# Patient Record
(demographics unavailable — no encounter records)

---

## 2024-10-16 NOTE — REASON FOR VISIT
[Initial Consultation] : an initial consultation [Redundant penile skin] : redundant penile skin [PCP] : ~pcp~ [Parents] : parents

## 2024-11-08 NOTE — PHYSICAL EXAM
[TextBox_92] : Penis: Incompletely circumcised with asymmetric irregular redundant skin, straight without adhesions or skin bridges; distinct penoscrotal and penopubic junctions. Meatus orthotopic without apparent stenosis. Testicles: Both testes in dependent position of scrotum without masses or tenderness. Scrotal/Inguinal: No palpable inguinal hernias, hydroceles

## 2024-11-08 NOTE — CONSULT LETTER
[FreeTextEntry1] : Dear Dr. TYE GARSIA ,  I had the pleasure of consulting on TRINITY SMITHPROBHERBS today.  Below is my note regarding the office visit today.  Thank you so very much for allowing me to participate in TRINITY's  care.  Please don't hesitate to call me should any questions or issues arise .  Sincerely,   David Dorantes MD, FACS, [facility] Chief, Pediatric Urology Professor of Urology and Pediatrics [facility] [employer]  President, American U[employer] Association - [place] Section Past-President, Societies for Pediatric Urology

## 2024-11-08 NOTE — CONSULT LETTER
[FreeTextEntry1] : Dear Dr. TYE GARSIA ,  I had the pleasure of consulting on TRINITY SMITHPROBHERBS today.  Below is my note regarding the office visit today.  Thank you so very much for allowing me to participate in TRINITY's  care.  Please don't hesitate to call me should any questions or issues arise .  Sincerely,   David Dorantes MD, FACS, [facility] Chief, Pediatric Urology Professor of Urology and Pediatrics [facility] [employer]  President, American U[employer] Association - [place] Section Past-President, Societies [employer] Pediatric Urology

## 2024-11-08 NOTE — CONSULT LETTER
[FreeTextEntry1] : Dear Dr. TYE GARSIA ,  I had the pleasure of consulting on TRINITY SMITHPROBHERBS today.  Below is my note regarding the office visit today.  Thank you so very much for allowing me to participate in TRINITY's  care.  Please don't hesitate to call me should any questions or issues arise .  Sincerely,   David Dorantes MD, FACS, [facility] Chief, Pediatric Urology Professor of Urology and Pediatrics [facility] [employer]  President, American U[employer] Association - [place] Section Past-President, Societies for Pediatric Urology

## 2024-11-08 NOTE — ASSESSMENT
[FreeTextEntry1] : TRINITY has irregular and very asymmetric skin following the circumcision..  I discussed the options including observation and surgery. The principles of the operation and the anticipated postoperative course were discussed.  After discussing the risks and benefits and possible complications (including but not limited to penile injury, bleeding, infection, penile deformity and need for additional surgery), the decision to proceed with penoplasty surgery under general anesthesia was made.  All questions were answered.

## 2024-11-08 NOTE — HISTORY OF PRESENT ILLNESS
[TextBox_4] : TRINITY is here for evaluation today. He was born at term after an unassisted conception and was circumcised in the nursery. The family's concerns with redundancy of the skin following the circumcision. The penis has not changed in its configuration since the parents first noticed this. No urinary tract or penile redness or infections. He makes ample wet diapers without hematuria.  No family history of penile abnormalities.   Of note, patient with history of Hirschsprung's disease, s/p pull through with anorectal botox 5/2023.

## 2025-01-07 NOTE — HISTORY OF PRESENT ILLNESS
[de-identified] : S/P 1 night hospitalization in [facility] for asthma attack. [FreeTextEntry6] : He developed a wheeze and went to [facility] ER, given treatments, wheezing persisted, so he was observed overnight and given further treatments, and DC's the next day. He was DC's yesterday. Grandma says he is still wheezing. some and she gave him two puffs of Albuterol Inhaler via spacer, and he responded well.  He is supposed to continue Albuterol through today and then on an as needed basis. No one else is sick at home. When he coughed this morning it sounded wet with mucus according to grandma.  This office is his medical home.

## 2025-01-07 NOTE — DISCUSSION/SUMMARY
[FreeTextEntry1] : Albuterol inhalation q4-6 hours for 7 days. Humidified air during sleep. Ensure adequate hydration and nourishment. F/U if not resolving.

## 2025-01-07 NOTE — PHYSICAL EXAM
[Alert] : alert [Normocephalic] : normocephalic [EOMI] : grossly EOMI [Clear] : right tympanic membrane clear [Pink Nasal Mucosa] : pink nasal mucosa [Supple] : supple [FROM] : full passive range of motion [Symmetric Chest Wall] : symmetric chest wall [Wheezing] : wheezing [Transmitted Upper Airway Sounds] : transmitted upper airway sounds [NL] : no abnormal lymph nodes palpated [Acute Distress] : no acute distress [Erythematous Oropharynx] : nonerythematous oropharynx [Enlarged Tonsils] : tonsils not enlarged [Rales] : no rales [Crackles] : no crackles [Tachypnea] : no tachypnea [Rhonchi] : no rhonchi [Belly Breathing] : no belly breathing [Subcostal Retractions] : no subcostal retractions [Suprasternal Retractions] : no suprasternal retractions [Hepatosplenomegaly] : no hepatosplenomegaly [FreeTextEntry1] : T99.7F [FreeTextEntry4] : some wet opaque mucus seen within nostrils [de-identified] : alert, resists exam, is watching a video of Ms. Gautam while being examined, and is in no distress [de-identified] : no retractions.

## 2025-02-26 NOTE — HISTORY OF PRESENT ILLNESS
[Dairy] : dairy [Sippy cup use] : Sippy cup use [Mother] : mother [Fruit] : fruit [Vegetables] : vegetables [Baby food] : baby food [Dairy] : dairy [Normal] : Normal [Tap water] : Primary Fluoride Source: Tap water [No] : No cigarette smoke exposure [Car seat in back seat] : Car seat in back seat [Smoke Detectors] : Smoke detectors [Carbon Monoxide Detectors] : Carbon monoxide detectors [Up to date] : Up to date [NO] : No [FreeTextEntry7] : Speech delay, fedding problems [de-identified] : Speech delay, feeding problems [de-identified] : has multiple food allergies. [LastFluorideTreatment] : to be scheduled [FreeTextEntry1] : Well visit. He is speech delayed and he only drinks pureed food mixed into milk, cannot chew and swallow. Mom says she was told when he was an infant that he had tongue tie, but says he latched perfectly for feeding so it was a non-issue. When asked she says she never sees him stick his tongue out. He sleeps well, has regular BMs and voids.  Growth is good. Speech development is delayed but gross motor development seems to be age appropriate.

## 2025-02-26 NOTE — DISCUSSION/SUMMARY
[Normal Growth] : growth [No Elimination Concerns] : elimination [No Feeding Concerns] : feeding [No Skin Concerns] : skin [Normal Sleep Pattern] : sleep [Assessment of Language Development] : assessment of language development [Temperament and Behavior] : temperament and behavior [Toilet Training] : toilet training [TV Viewing] : tv viewing [Safety] : safety [No Medications] : ~He/She~ is not on any medications [Mother] : mother [de-identified] : does not speak and only eats pureed food and only if put in milk bottle [FreeTextEntry4] : Question of tongue tie. [FreeTextEntry1] : Fingerstick  done and blood obtained/prepped/sent to lab for CBC and Lead screening. New speech referral given. Mom will call therapists affiliated with [facility] to find a therapist specializing is oral motor therapy and feeding therapy along with speech therapy. Scheduled for recircumcision of penis in April, will come for pre-op clearance then. Will await new speech evaluation to see if needs tongue tie cutting.

## 2025-02-26 NOTE — HISTORY OF PRESENT ILLNESS
[Dairy] : dairy [Sippy cup use] : Sippy cup use [Mother] : mother [Fruit] : fruit [Vegetables] : vegetables [Baby food] : baby food [Dairy] : dairy [Normal] : Normal [Tap water] : Primary Fluoride Source: Tap water [No] : No cigarette smoke exposure [Car seat in back seat] : Car seat in back seat [Smoke Detectors] : Smoke detectors [Carbon Monoxide Detectors] : Carbon monoxide detectors [Up to date] : Up to date [NO] : No [FreeTextEntry7] : Speech delay, fedding problems [de-identified] : Speech delay, feeding problems [de-identified] : has multiple food allergies. [LastFluorideTreatment] : to be scheduled [FreeTextEntry1] : Well visit. He is speech delayed and he only drinks pureed food mixed into milk, cannot chew and swallow. Mom says she was told when he was an infant that he had tongue tie, but says he latched perfectly for feeding so it was a non-issue. When asked she says she never sees him stick his tongue out. He sleeps well, has regular BMs and voids.  Growth is good. Speech development is delayed but gross motor development seems to be age appropriate.

## 2025-02-26 NOTE — DEVELOPMENTAL MILESTONES
[Yes: _______] : yes, [unfilled] [Plays pretend with toys or dolls] : plays pretend with toys or dolls [Walks up steps, using one] : walks up steps, using one foot, then the other [Runs well without falling] : runs well without falling [Catches a large ball] : catches a large ball [Copies a vertical line] : copies a vertical line [No] : Not Completed. [Urinates in a potty or toilet] : does not urinate in a potty or toilet [Pokes food with fork] : does not poke food with fork [Uses pronouns correctly] : does not use pronouns correctly [Explains the reason for things,] : does not explain the reason for things, such as needing a sweater when it's cold [Names at least one color] : does not name at least one color [Grasps crayon with thumb] : does not grasp crayon with thumb and fingers instead of fist [Plays alongside other children] : plays alongside other children [Takes off some clothing] : takes off some clothing [Kicks ball] : kicks ball  [Jumps off ground with 2 feet] : jumps off ground with 2 feet [Runs with coordination] : runs with coordination [Climbs up a ladder at a] : climbs up a ladder at a playground [Stacks objects] : stacks objects [Turns book pages] : turns book pages [Yes] : Completed. [Scoops well with spoon] : does not scoop well with spoon [Uses 50 words] : does not use 50 words [Combine 2 words into phrase or] : does not combine 2 words into phrase or sentences [Follows 2-step command] : does not follow 2-step command [Uses words that are 50% intelligible] : does not use words that are 50% intelligible to strangers

## 2025-02-26 NOTE — PHYSICAL EXAM
[Alert] : alert [No Acute Distress] : no acute distress [Normocephalic] : normocephalic [Anterior Bishop Closed] : anterior fontanelle closed [Red Reflex Bilateral] : red reflex bilateral [PERRL] : PERRL [Normally Placed Ears] : normally placed ears [Auricles Well Formed] : auricles well formed [Clear Tympanic membranes with present light reflex and bony landmarks] : clear tympanic membranes with present light reflex and bony landmarks [No Discharge] : no discharge [Nares Patent] : nares patent [Palate Intact] : palate intact [Uvula Midline] : uvula midline [Tooth Eruption] : tooth eruption  [Supple, full passive range of motion] : supple, full passive range of motion [No Palpable Masses] : no palpable masses [Clear to Auscultation Bilaterally] : clear to auscultation bilaterally [Regular Rate and Rhythm] : regular rate and rhythm [S1, S2 present] : S1, S2 present [No Murmurs] : no murmurs [+2 Femoral Pulses] : +2 femoral pulses [Soft] : soft [NonTender] : non tender [Non Distended] : non distended [Normoactive Bowel Sounds] : normoactive bowel sounds [No Hepatomegaly] : no hepatomegaly [No Splenomegaly] : no splenomegaly [Shashi 1] : Shashi 1 [Circumcised] : circumcised [Central Urethral Opening] : central urethral opening [Testicles Descended Bilaterally] : testicles descended bilaterally [Normally Placed] : normally placed [No Abnormal Lymph Nodes Palpated] : no abnormal lymph nodes palpated [No Clavicular Crepitus] : no clavicular crepitus [Symmetric Buttocks Creases] : symmetric buttocks creases [No Spinal Dimple] : no spinal dimple [NoTuft of Hair] : no tuft of hair [Cranial Nerves Grossly Intact] : cranial nerves grossly intact [No Rash or Lesions] : no rash or lesions [FreeTextEntry1] : does not talk with examiner nor with mom, but makes a lot of sounds. as if trying to say something [de-identified] : Fights to examine mouth so cannot verify if tongue tied or not. Dentition appears normal, tongue surface normal.

## 2025-02-26 NOTE — PHYSICAL EXAM
[Alert] : alert [No Acute Distress] : no acute distress [Normocephalic] : normocephalic [Anterior Honor Closed] : anterior fontanelle closed [Red Reflex Bilateral] : red reflex bilateral [PERRL] : PERRL [Normally Placed Ears] : normally placed ears [Auricles Well Formed] : auricles well formed [Clear Tympanic membranes with present light reflex and bony landmarks] : clear tympanic membranes with present light reflex and bony landmarks [No Discharge] : no discharge [Nares Patent] : nares patent [Palate Intact] : palate intact [Uvula Midline] : uvula midline [Tooth Eruption] : tooth eruption  [Supple, full passive range of motion] : supple, full passive range of motion [No Palpable Masses] : no palpable masses [Clear to Auscultation Bilaterally] : clear to auscultation bilaterally [Regular Rate and Rhythm] : regular rate and rhythm [S1, S2 present] : S1, S2 present [No Murmurs] : no murmurs [+2 Femoral Pulses] : +2 femoral pulses [Soft] : soft [NonTender] : non tender [Non Distended] : non distended [Normoactive Bowel Sounds] : normoactive bowel sounds [No Hepatomegaly] : no hepatomegaly [No Splenomegaly] : no splenomegaly [Shashi 1] : Shashi 1 [Circumcised] : circumcised [Central Urethral Opening] : central urethral opening [Testicles Descended Bilaterally] : testicles descended bilaterally [Normally Placed] : normally placed [No Abnormal Lymph Nodes Palpated] : no abnormal lymph nodes palpated [No Clavicular Crepitus] : no clavicular crepitus [Symmetric Buttocks Creases] : symmetric buttocks creases [No Spinal Dimple] : no spinal dimple [NoTuft of Hair] : no tuft of hair [Cranial Nerves Grossly Intact] : cranial nerves grossly intact [No Rash or Lesions] : no rash or lesions [de-identified] : Fights to examine mouth so cannot verify if tongue tied or not. Dentition appears normal, tongue surface normal. [FreeTextEntry1] : does not talk with examiner nor with mom, but makes a lot of sounds. as if trying to say something

## 2025-02-26 NOTE — DISCUSSION/SUMMARY
[Normal Growth] : growth [No Elimination Concerns] : elimination [No Feeding Concerns] : feeding [No Skin Concerns] : skin [Normal Sleep Pattern] : sleep [Assessment of Language Development] : assessment of language development [Temperament and Behavior] : temperament and behavior [Toilet Training] : toilet training [TV Viewing] : tv viewing [Safety] : safety [No Medications] : ~He/She~ is not on any medications [Mother] : mother [de-identified] : does not speak and only eats pureed food and only if put in milk bottle [FreeTextEntry4] : Question of tongue tie. [FreeTextEntry1] : Fingerstick  done and blood obtained/prepped/sent to lab for CBC and Lead screening. New speech referral given. Mom will call therapists affiliated with [facility] to find a therapist specializing is oral motor therapy and feeding therapy along with speech therapy. Scheduled for recircumcision of penis in April, will come for pre-op clearance then. Will await new speech evaluation to see if needs tongue tie cutting.

## 2025-04-01 NOTE — HISTORY OF PRESENT ILLNESS
[Good] : Good [Prior Anesthesia] : Prior anesthesia [Anesthesia Reaction] : anesthesia reaction [Fever] : no fever [Chills] : no chills [Runny Nose] : no runny nose [Earache] : no earache [Headache] : no headache [Sore Throat] : no sore throat [Cough] : no cough [Appetite] : no decrease in appetite [Nausea] : no nausea [Vomiting] : no vomiting [Abdominal Pain] : no abdominal pain [Diarrhea] : no diarrhea [Easy Bruising] : no easy bruising [Rash] : no rash [Dysuria] : no dysuria [Urinary Frequency] : no urinary frequency [Prev Anesthesia Reaction] : no previous anesthesia reaction [Diabetes] : no diabetes [Pulmonary Disease] : no pulmonary disease [Renal Disease] : no renal disease [GI Disease] : no gastrointestinal disease [Sleep Apnea] : no sleep apnea [Transfusion Reaction] : no transfusion reaction [Impaired Immunity] : no impaired immunity [Frequent use of NSAIDs] : no use of NSAIDs [Clotting Disorder] : no clotting disorder [Bleeding Disorder] : no bleeding disorder [Sudden Death] : no sudden death [FreeTextEntry2] : 4/18/25 [de-identified] : Dr. David Dorantes [de-identified] : Great Aunt  had a reaction to some anesthetic agent, stopped breathing [FreeTextEntry1] : Here for Preop exam prior to revision of his original circumcision. He comes in good health today.  He has numerous food allergies including shrimp, scallops, sesame seed, salmon, tuna, eggs, wheat and soy. He also has a mild allergy to milk.

## 2025-04-01 NOTE — PHYSICAL EXAM
[General Appearance - Alert] : alert [General Appearance - Well-Appearing] : well appearing [General Appearance - In No Acute Distress] : in no acute distress [Appearance Of Head] : the head was normocephalic [Evidence Of Head Injury] : threre was no evidence of injury [Facies] : no facial abnormalities were observed [Sclera] : the conjunctiva were normal [Outer Ear] : the ears and nose were normal in appearance [Examination Of The Oral Cavity] : mucous membranes were moist and pink [Neck Cervical Mass (___cm)] : no neck mass was observed [Respiration, Rhythm And Depth] : normal respiratory rhythm and effort [Auscultation Breath Sounds / Voice Sounds] : clear bilateral breath sounds [Heart Rate And Rhythm] : heart rate and rhythm were normal [Heart Sounds] : normal S1 and S2 [Murmurs] : no murmurs [Bowel Sounds] : normal bowel sounds [Abdomen Soft] : soft [Abdomen Tenderness] : non-tender [Abdominal Distention] : nondistended [Musculoskeletal Exam: Normal Movement Of All Extremities] : normal movements of all extremities [No Visual Abnormalities] : no visible abnormailities [Motor Tone] : muscle strength and tone were normal [Generalized Lymph Node Enlargement] : no lymphadenopathy [Skin Color & Pigmentation] : normal skin color and pigmentation [] : no significant rash [Skin Lesions] : no skin lesions [Penis Abnormality] : the penis was normal [Scrotum] : the scrotum was normal [Testes Cryptorchism] : both testicles were descended [Testes Mass (___cm)] : there were no testicular masses

## 2025-04-18 NOTE — CONSULT LETTER
[FreeTextEntry1] :   Dear Dr. TYE GARSIA,   Our mutual patient, TRINITY VALENCIA underwent surgery today as outlined below. The procedure went well and he was discharged from the PACU after an uneventful stay. Discharge instructions were provided in writing. Instructions regarding follow up were also provided.   Sincerely,   David Dorantes MD, FACS, [facility] Chief, Pediatric Urology Professor of Urology and Pediatrics [facility] [employer] at [facility].   President-elect, American Urological Association

## 2025-04-18 NOTE — CONSULT LETTER
[FreeTextEntry1] :   Dear Dr. TYE GARSIA,   Our mutual patient, TRINITY VALENCIA underwent surgery today as outlined below. The procedure went well and he was discharged from the PACU after an uneventful stay. Discharge instructions were provided in writing. Instructions regarding follow up were also provided.   Sincerely,   David Dorantes MD, FACS, [facility] Chief, Pediatric Urology Professor of Urology and Pediatrics [facility] [employer] at [facility].   President-elect, American Urological Association

## 2025-04-18 NOTE — PROCEDURE
[FreeTextEntry3] : REVISION OF CIRCUMCISION VPLASTY OF VENTRAL COLLAR PENILE DETORSION (PARTIAL) PENILE BLOCK [FreeTextEntry5] : NONE [FreeTextEntry6] :  BANDAGE X 48 HRS BACITRACIN EVERY DIAPER CHANGE AFTER BANDAGE OFF X 2 DAYS THEN SWITCH TO VASELINE/AQUAPHOR X 1 MONTH BATHE 72 HRS FU 2-3 WEEKS (PHOTO OK)